# Patient Record
Sex: FEMALE | Race: WHITE | NOT HISPANIC OR LATINO | URBAN - NONMETROPOLITAN AREA
[De-identification: names, ages, dates, MRNs, and addresses within clinical notes are randomized per-mention and may not be internally consistent; named-entity substitution may affect disease eponyms.]

---

## 2018-06-28 ENCOUNTER — PREPPED CHART (OUTPATIENT)
Dept: URBAN - NONMETROPOLITAN AREA CLINIC 4 | Facility: CLINIC | Age: 63
End: 2018-06-28

## 2018-06-28 NOTE — PATIENT DISCUSSION
(Observe) Observe for now without intervention. The patient was advised to contact office with any change or worsening of vision.

## 2022-04-18 ASSESSMENT — VISUAL ACUITY
OS_CC: 20/25
OD_CC: 20/30

## 2022-04-18 ASSESSMENT — TONOMETRY
OD_IOP_MMHG: 18
OS_IOP_MMHG: 20

## 2022-04-29 ENCOUNTER — COMPREHENSIVE EXAM (OUTPATIENT)
Dept: URBAN - NONMETROPOLITAN AREA CLINIC 4 | Facility: CLINIC | Age: 67
End: 2022-04-29

## 2022-04-29 DIAGNOSIS — H52.03: ICD-10-CM

## 2022-04-29 PROCEDURE — 92004 COMPRE OPH EXAM NEW PT 1/>: CPT

## 2022-04-29 PROCEDURE — 92015 DETERMINE REFRACTIVE STATE: CPT

## 2022-04-29 ASSESSMENT — TONOMETRY
OS_IOP_MMHG: 19
OD_IOP_MMHG: 19

## 2022-04-29 ASSESSMENT — VISUAL ACUITY
OU_CC: J1+
OS_CC: 20/30
OD_CC: 20/30
OU_CC: 20/30

## 2023-04-27 ENCOUNTER — POST-OP (OUTPATIENT)
Dept: URBAN - NONMETROPOLITAN AREA CLINIC 4 | Facility: CLINIC | Age: 68
End: 2023-04-27

## 2023-04-27 ENCOUNTER — SURGERY/PROCEDURE (OUTPATIENT)
Dept: URBAN - METROPOLITAN AREA SURGERY 3 | Facility: SURGERY | Age: 68
End: 2023-04-27

## 2023-04-27 DIAGNOSIS — H25.11: ICD-10-CM

## 2023-04-27 DIAGNOSIS — Z96.1: ICD-10-CM

## 2023-04-27 PROCEDURE — 99024 POSTOP FOLLOW-UP VISIT: CPT

## 2023-04-27 PROCEDURE — 68841 INSJ RX ELUT IMPLT LAC CANAL: CPT

## 2023-04-27 PROCEDURE — 66982 XCAPSL CTRC RMVL CPLX WO ECP: CPT

## 2023-04-27 ASSESSMENT — TONOMETRY: OD_IOP_MMHG: 18

## 2023-04-27 ASSESSMENT — VISUAL ACUITY: OD_SC: 20/80

## 2023-05-04 ENCOUNTER — POST OP/EVAL OF SECOND EYE (OUTPATIENT)
Dept: RURAL CLINIC 1 | Facility: CLINIC | Age: 68
End: 2023-05-04

## 2023-05-04 DIAGNOSIS — H25.042: ICD-10-CM

## 2023-05-04 DIAGNOSIS — H25.12: ICD-10-CM

## 2023-05-04 PROCEDURE — 99213 OFFICE O/P EST LOW 20 MIN: CPT

## 2023-05-04 ASSESSMENT — VISUAL ACUITY
OD_SC: 20/20
OS_CC: 20/40

## 2023-05-11 ENCOUNTER — SURGERY/PROCEDURE (OUTPATIENT)
Dept: URBAN - METROPOLITAN AREA SURGERY 3 | Facility: SURGERY | Age: 68
End: 2023-05-11

## 2023-05-11 ENCOUNTER — POST-OP (OUTPATIENT)
Dept: URBAN - NONMETROPOLITAN AREA CLINIC 4 | Facility: CLINIC | Age: 68
End: 2023-05-11

## 2023-05-11 DIAGNOSIS — H25.12: ICD-10-CM

## 2023-05-11 DIAGNOSIS — Z96.1: ICD-10-CM

## 2023-05-11 PROCEDURE — 66984 XCAPSL CTRC RMVL W/O ECP: CPT

## 2023-05-11 PROCEDURE — 99024 POSTOP FOLLOW-UP VISIT: CPT

## 2023-05-11 PROCEDURE — 68841 INSJ RX ELUT IMPLT LAC CANAL: CPT

## 2023-05-11 ASSESSMENT — VISUAL ACUITY
OD_SC: 20/20
OS_SC: 20/60

## 2023-05-11 ASSESSMENT — TONOMETRY
OD_IOP_MMHG: 17
OS_IOP_MMHG: 18

## 2023-05-18 ENCOUNTER — POST-OP (OUTPATIENT)
Dept: RURAL CLINIC 1 | Facility: CLINIC | Age: 68
End: 2023-05-18

## 2023-05-18 DIAGNOSIS — Z96.1: ICD-10-CM

## 2023-05-18 PROCEDURE — 99024 POSTOP FOLLOW-UP VISIT: CPT

## 2023-05-18 ASSESSMENT — VISUAL ACUITY
OD_SC: 20/20
OS_SC: 20/20

## 2023-05-18 ASSESSMENT — TONOMETRY
OS_IOP_MMHG: 14
OD_IOP_MMHG: 14

## 2024-04-01 NOTE — PATIENT DISCUSSION
-- DO NOT REPLY / DO NOT REPLY ALL --  -- Message is from [facility] ([facility]) --    Referral Request  Name of Specialist: unknown  Provider's specialty: Physical Therapy    Medical condition for referral:  lower back right hip right knee    Is this a NEW request?: yes      Referral ordered by: Dr Zari Gallo      Insurance type: [employer]      Payor: [employer] / Plan: [place] MEDICAID HMO / Product Type: T19 HMO      Preferred Delivery Method   Input in Epic    Caller Information         Type Contact Phone/Fax    03/14/2024 04:16 PM CDT Phone (Outgoing) Stephanie Burciaga (Self) [phone] (M)    Spoke with Patient     03/15/2024 01:19 PM CDT Phone (Outgoing) Stephanie Burciaga (Self) [phone] (M)    04/01/2024 09:59 AM CDT Phone (Incoming) Stephanie Burciaga (Self) [phone] (M)            Alternative phone number: none    Can a detailed message be left? Yes       The cataracts are creating some visual symptoms that are tolerable at this time.